# Patient Record
(demographics unavailable — no encounter records)

---

## 2024-10-08 NOTE — PHYSICAL EXAM
[Alert] : alert [Obese] : obese [No Acute Distress] : no acute distress [Normal Voice/Communication] : normal voice communication [Normal Hearing] : hearing was normal [No Respiratory Distress] : no respiratory distress [Normal Rate and Effort] : normal respiratory rate and effort [Normal Gait] : normal gait [Oriented x3] : oriented to person, place, and time [Normal Affect] : the affect was normal [Normal Insight/Judgement] : insight and judgment were intact [Normal Mood] : the mood was normal

## 2024-10-09 NOTE — ASSESSMENT
[FreeTextEntry1] : 1. T2D A1C goal <7% A1C - 6.4% (2/7/24) from 7.2% (10/3/23) from 6.6% (8/9/23) from 7.6 (1/11/23) from >14% (8/15/2022) Current regimen: Mounjaro 12.5 mg/weekly Glucometer readings at home reveal fasting BG at goal Glucometer reading performed in office today is at goal postprandially  Jina has experienced improvement of glycemic control on Mounjaro, which was increased to 12.5mg/weekly since last visit.  He feels well and is increasing physical activity for further glycemic benefits and weight loss promotion -- labs today  -- continue Mounjaro 12.5mg/weekly -- continue plan for increased physical activity -- continue diet modification  2. Hypogonadism Following urologist, Dr Roland Started Testosterone cypionate with urology, due for mid-cycle labs  Labs today, I will send [facility] message with results Follow-up in 6months, or sooner, as needed pending labs  Maura Logan, MS, [facility]-BC, [facility] 10/08/2024

## 2024-10-09 NOTE — ADDENDUM
[FreeTextEntry1] : 10/09/2024, addendum, [facility] message sent with lab interpretation

## 2024-10-09 NOTE — HISTORY OF PRESENT ILLNESS
[FreeTextEntry1] :  BREONNA LOZANO is a 53 year old male with pmhx of T2D (dx in 2013), HTN, HLD, hypogonadism who presents for T2D follow-up. At time of dx, hospitalized for BG of 500s  Previously following Dr Montgomery, last visit, 3/1/2023 Last visit, 02/07/2024 with myself  Interval change: Since last visit, has titrated Mounjaro to 12.5 mg/weekly with tolerance +appetite suppression. No GI issues Has been experiencing intermittent foot swelling, will see podiatry. With change of shoes from the Nikes that he started wearing, no swelling x 1 week Working to increase physical activity, likes running and strength training, pause with foot swelling over the past few weeks   A1C - 6.4% (2/7/24) from 7.2% (10/3/23) from 6.6% (8/9/23) from 7.6 (1/11/23) from >14% (8/15/2022) Office Fingerstick -- 100 (fasting)  Current medication: - Mounjaro 12.5 mg/weekly  Past medication: - Ozempic - Levemir - metformin 1000mg BID  BREONNA reports they take their diabetes medication ALL of the time. BREONNA denies hypoglycemia symptoms or BG <70  Diabetes Self-Management: Monitoring BG in fasting state daily -- fasting BG range: low 100s,   Diet-- Typically consumes 3 meals/daily, +/- snacks  Ophthalmology: has upcoming FU in March 2024 Podiatry: FANG, today, 10/08/2024  Denies personal history of heart disease. Denies personal history of renal disease.

## 2024-10-09 NOTE — ASSESSMENT
[FreeTextEntry1] : 1. T2D A1C goal <7% A1C - 6.4% (2/7/24) from 7.2% (10/3/23) from 6.6% (8/9/23) from 7.6 (1/11/23) from >14% (8/15/2022) Current regimen: Mounjaro 12.5 mg/weekly Glucometer readings at home reveal fasting BG at goal Glucometer reading performed in office today is at goal postprandially  Jina has experienced improvement of glycemic control on Mounjaro, which was increased to 12.5mg/weekly since last visit.  He feels well and is increasing physical activity for further glycemic benefits and weight loss promotion -- labs today  -- continue Mounjaro 12.5mg/weekly -- continue plan for increased physical activity -- continue diet modification  2. Hypogonadism Following urologist, Dr Roland Started Testosterone cypionate with urology, due for mid-cycle labs  Labs today, I will send [facility] message with results Follow-up in 6months, or sooner, as needed pending labs  Maura Logan, MS, [facility]-BC, [facility] 10/08/2024

## 2024-10-09 NOTE — REVIEW OF SYSTEMS
[Negative] : Psychiatric [Nausea] : no nausea [Constipation] : no constipation [Abdominal Pain] : no abdominal pain [Vomiting] : no vomiting [Diarrhea] : no diarrhea [Polyuria] : no polyuria [Joint Pain] : no joint pain [Polydipsia] : no polydipsia [FreeTextEntry5] : intermittent foot swelling with specific shoe, resolved with not weaing shoes

## 2024-10-09 NOTE — ADDENDUM
[FreeTextEntry1] : 10/09/2024, addendum, [facility] message sent with lab interpretation

## 2024-10-09 NOTE — ASSESSMENT
[FreeTextEntry1] : 1. T2D A1C goal <7% A1C - 6.4% (2/7/24) from 7.2% (10/3/23) from 6.6% (8/9/23) from 7.6 (1/11/23) from >14% (8/15/2022) Current regimen: Mounjaro 12.5 mg/weekly Glucometer readings at home reveal fasting BG at goal Glucometer reading performed in office today is at goal postprandially  Jina has experienced improvement of glycemic control on Mounjaro, which was increased to 12.5mg/weekly since last visit.  He feels well and is increasing physical activity for further glycemic benefits and weight loss promotion -- labs today  -- continue Mounjaro 12.5mg/weekly -- continue plan for increased physical activity -- continue diet modification  2. Hypogonadism Following urologist, Dr Roland Started Testosterone cypionate with urology, due for mid-cycle labs  Labs today, I will send [facility] message with results Follow-up in 6months, or sooner, as needed pending labs  Maura Logan, MS, [facility]-BC, [facility] 10/08/2024

## 2024-10-09 NOTE — ADDENDUM
[FreeTextEntry1] : 10/09/2024, addendum, [facility] message sent with lab interpretation

## 2025-04-06 NOTE — ASSESSMENT
[FreeTextEntry1] : 1. T2D A1C goal <7% A1C - 6.4% (2/7/24) from 7.2% (10/3/23) from 6.6% (8/9/23) from 7.6 (1/11/23) from >14% (8/15/2022) Current regimen: Mounjaro 12.5 mg/weekly Glucometer readings at home reveal fasting BG at goal Glucometer reading performed in office today is at goal postprandially  Jina has experienced improvement of glycemic control on Mounjaro, which was increased to 12.5mg/weekly since last visit.  He feels well and is increasing physical activity for further glycemic benefits and weight loss promotion -- labs today  -- continue Mounjaro 12.5mg/weekly -- continue plan for increased physical activity -- continue diet modification  2. Hypogonadism Following urologist, Dr Roland Started Testosterone cypionate with urology, due for mid-cycle labs  Labs today, I will send [facility] message with results Follow-up in 6months, or sooner, as needed pending labs  Maura Logan, MS, FNP-BC, [facility]

## 2025-04-06 NOTE — HISTORY OF PRESENT ILLNESS
[FreeTextEntry1] :  BREONNA LOZANO is a 53 year old male with pmhx of T2D (dx in 2013), HTN, HLD, hypogonadism who presents for T2D follow-up. At time of dx, hospitalized for BG of 500s  Previously following Dr Montgomery, last visit, 3/1/2023 Last visit, 10/08/2024, with myself  Interval change: Since last visit, has titrated Mounjaro to 12.5 mg/weekly with tolerance +appetite suppression. No GI issues Has been experiencing intermittent foot swelling, will see podiatry. With change of shoes from the Nikes that he started wearing, no swelling x 1 week Working to increase physical activity, likes running and strength training, pause with foot swelling over the past few weeks   A1C - 6.4% (2/7/24) from 7.2% (10/3/23) from 6.6% (8/9/23) from 7.6 (1/11/23) from >14% (8/15/2022) Office Fingerstick -- 100 (fasting)  Current medication: - Mounjaro 12.5 mg/weekly  Past medication: - Ozempic - Levemir - metformin 1000mg BID  BREONNA reports they take their diabetes medication ALL of the time. BREONNA denies hypoglycemia symptoms or BG <70  Diabetes Self-Management: Monitoring BG in fasting state daily -- fasting BG range: low 100s,   Diet-- Typically consumes 3 meals/daily, +/- snacks  Ophthalmology: has upcoming FU in March 2024 Podiatry: FANG, today, 10/08/2024  Denies personal history of heart disease. Denies personal history of renal disease.

## 2025-07-18 NOTE — PHYSICAL EXAM
[Normal Appearance] : normal appearance [Well Groomed] : well groomed [General Appearance - In No Acute Distress] : no acute distress [Respiration, Rhythm And Depth] : normal respiratory rhythm and effort [Exaggerated Use Of Accessory Muscles For Inspiration] : no accessory muscle use [Urethral Meatus] : meatus normal [Penis Abnormality] : normal circumcised penis [Testes Tenderness] : no tenderness of the testes [Scrotum] : the scrotum was normal [Testes Mass (___cm)] : there were no testicular masses [Prostate Tenderness] : the prostate was not tender [No Prostate Nodules] : no prostate nodules [Prostate Size ___ gm] : prostate size [unfilled] gm [Normal Station and Gait] : the gait and station were normal for the patient's age [] : no rash [No Focal Deficits] : no focal deficits [Oriented To Time, Place, And Person] : oriented to person, place, and time [Affect] : the affect was normal [Mood] : the mood was normal [de-identified] : B/L 8cc testes, no masses. Prostate smooht

## 2025-07-18 NOTE — ASSESSMENT
[FreeTextEntry1] : 55yo M hypogonadism, ED -UA, Ucx -peak trough hormones next week -PSA, blood type per request -contiinue Tcyp 0.5ml weekly -continue tadalafil 20mg PRN -F/U 1 yr

## 2025-07-18 NOTE — HISTORY OF PRESENT ILLNESS
[FreeTextEntry1] : BREONNA LOZANO is a 54 year M presenting on 07/18/2025 PMH: hypogonadism, ED, DM2, HTN, HLD meds: mounjaro  1.5 yr F/U  Hypogonadism: T cyp 0.5mL weekly baseline Low energy, low libido. HCG and T gel in the past  No urinary bother. Improved frequency with mounjaro No leakage No hematuria  ED: tadalafil 20mg PRN. effective  No FHX prostate cancer  , 10/2024 E2 66, 10/2024 Hct 44, 10/2024  PSA: 1.31, 10/2024 0.93, 9/2023